# Patient Record
Sex: MALE | Race: WHITE | NOT HISPANIC OR LATINO | Employment: FULL TIME | URBAN - METROPOLITAN AREA
[De-identification: names, ages, dates, MRNs, and addresses within clinical notes are randomized per-mention and may not be internally consistent; named-entity substitution may affect disease eponyms.]

---

## 2024-02-27 ENCOUNTER — EVALUATION (OUTPATIENT)
Dept: PHYSICAL THERAPY | Facility: HOSPITAL | Age: 39
End: 2024-02-27
Payer: COMMERCIAL

## 2024-02-27 DIAGNOSIS — M77.01 MEDIAL EPICONDYLITIS, RIGHT ELBOW: ICD-10-CM

## 2024-02-27 DIAGNOSIS — R29.898 ARM WEAKNESS: Primary | ICD-10-CM

## 2024-02-27 PROCEDURE — 97161 PT EVAL LOW COMPLEX 20 MIN: CPT | Mod: GP | Performed by: PHYSICAL THERAPIST

## 2024-02-27 PROCEDURE — 97110 THERAPEUTIC EXERCISES: CPT | Mod: GP | Performed by: PHYSICAL THERAPIST

## 2024-02-27 ASSESSMENT — PATIENT HEALTH QUESTIONNAIRE - PHQ9
2. FEELING DOWN, DEPRESSED OR HOPELESS: NOT AT ALL
SUM OF ALL RESPONSES TO PHQ9 QUESTIONS 1 AND 2: 0
1. LITTLE INTEREST OR PLEASURE IN DOING THINGS: NOT AT ALL

## 2024-02-27 ASSESSMENT — ENCOUNTER SYMPTOMS
DEPRESSION: 0
OCCASIONAL FEELINGS OF UNSTEADINESS: 0
LOSS OF SENSATION IN FEET: 0

## 2024-02-27 ASSESSMENT — PAIN - FUNCTIONAL ASSESSMENT: PAIN_FUNCTIONAL_ASSESSMENT: 0-10

## 2024-02-27 ASSESSMENT — PAIN SCALES - GENERAL: PAINLEVEL_OUTOF10: 4

## 2024-02-27 NOTE — PROGRESS NOTES
Physical Therapy    Physical Therapy Evaluation    Patient Name: David Baptiste  MRN: [id number]  : 1985  Referring Physician: Pranav Aparicio  Today's Date: 2024  Time Calculation  Start Time: 845  Stop Time: 929  Time Calculation (min): 44 min  PT Evaluation Time Entry  PT Evaluation (Low) Time Entry: 35  PT Therapeutic Procedures Time Entry  Therapeutic Exercise Time Entry: 10             Current Problem  Problem List Items Addressed This Visit             ICD-10-CM    Arm weakness - Primary R29.898    Relevant Orders    Follow Up In Physical Therapy    Medial epicondylitis, right elbow M77.01    Relevant Orders    Follow Up In Physical Therapy          SUBJECTIVE  Subjective   Patient reports right medial elbow pain that began in 2023 insidiously . This is leading to difficulty with gripping for activities such shaking hands.  Pain is reported to range 1-8/10 with daily activities.  Patient states that their goal is to decrease pain with use of right  UE with physical therapy intervention. He is right hand dominant.  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    Prior level of function: no functional limits    Patient's name and  were confirmed this date.    General  General Comment:  POC    Precautions  Precautions  STEADI Fall Risk Score (The score of 4 or more indicates an increased risk of falling): 0  Precautions Comment: none       Pain  Pain Assessment: 0-10  Pain Score: 4  Pain Location: Elbow  Pain Orientation: Right        OBJECTIVE:    Upper Extremity Strength:  UE strength not listed below is WNL  MMT 5/5 max  LEFT RIGHT        Wrist flex 4/5 5/5   Wrist ext 4+/5 5/5   Pronation and Supination 4+/5 4+/5                          strength 73# right vs 112# left    Upper Extremity ROM:   Go UE AROM WNL throughout                 Palpation Tenderness at right medial epicondyle    Special tests:positive right medial and negative lateral  epicondyle test    Other Measures  Disability of Arm Shoulder Hand (DASH): 22.73       TREATMENT:  Access Code: [email or web address]  URL: [email or web address]  Date: 02/27/2024  Prepared by: Deniz Reaves    Exercises  - Seated Wrist Extension Stretch  - 3 x daily - 7 x weekly - 3 reps - 30 seconds hold    EXERCISES Date  Date Date Date   VISIT # # 1 # # #   HEP 10'             Pulley flexion                     Right wrist flex, ext, RD, UD       Right supination and pronation                                                                             US right medial elbow              Manual cross fiber massage                                                                                 ASSESSEMENT  Symptoms indicate right medial epicondylitis    Rehab potential: Excellent    PLAN  Treatment/Interventions: Dry needling, Electrical stimulation, Therapeutic activities, Therapeutic exercises, Ultrasound, Manual therapy  PT Plan: Skilled PT  Duration: 8 weeks    Pt. Is in agreement with PT plan.  Goals:  Active       PT Problem       PT Goal 1       Start:  02/27/24    Expected End:  04/27/24       Short tem goals to be achieve within 4 weeks:  1. Pt's right UE strength will improve to 4+/5 throughout to improve lifting objects    Long term goals to be achieved within 8 weeks:    1. Pt's right  strength will improve to 100# to improve grasping objects  2.   Pt's stated goal is to shake hands without limitation d/t pain

## 2024-03-08 ENCOUNTER — APPOINTMENT (OUTPATIENT)
Dept: RADIOLOGY | Facility: HOSPITAL | Age: 39
End: 2024-03-08
Payer: COMMERCIAL

## 2024-03-11 ENCOUNTER — TREATMENT (OUTPATIENT)
Dept: PHYSICAL THERAPY | Facility: HOSPITAL | Age: 39
End: 2024-03-11
Payer: COMMERCIAL

## 2024-03-11 DIAGNOSIS — M77.01 MEDIAL EPICONDYLITIS, RIGHT ELBOW: ICD-10-CM

## 2024-03-11 DIAGNOSIS — R29.898 ARM WEAKNESS: ICD-10-CM

## 2024-03-11 PROCEDURE — 97110 THERAPEUTIC EXERCISES: CPT | Mod: GP | Performed by: PHYSICAL THERAPIST

## 2024-03-11 PROCEDURE — 97140 MANUAL THERAPY 1/> REGIONS: CPT | Mod: GP | Performed by: PHYSICAL THERAPIST

## 2024-03-11 PROCEDURE — 97014 ELECTRIC STIMULATION THERAPY: CPT | Mod: GP | Performed by: PHYSICAL THERAPIST

## 2024-03-11 ASSESSMENT — PAIN - FUNCTIONAL ASSESSMENT: PAIN_FUNCTIONAL_ASSESSMENT: 0-10

## 2024-03-11 ASSESSMENT — PAIN SCALES - GENERAL: PAINLEVEL_OUTOF10: 2

## 2024-03-11 NOTE — PROGRESS NOTES
Physical Therapy    Physical Therapy Treatment    Patient Name: Daivd Baptiste  MRN: [id number]  : 1985   Pranav Aparicio  Today's Date: 3/11/2024  Time Calculation  Start Time: 1115  Stop Time: 1155  Time Calculation (min): 40 min  PT Therapeutic Procedures Time Entry  Manual Therapy Time Entry: 10  Therapeutic Exercise Time Entry: 12  PT Modalities Time Entry  E-Stim (Unattended) Time Entry: 15  Ultrasound Time Entry: 8          Current Problem  Problem List Items Addressed This Visit             ICD-10-CM    Arm weakness R29.898    Medial epicondylitis, right elbow M77.01        General  General Comment:  POC    Subjective   Pt reports Sx with handshakes has improved with HEP performance   Precautions  Precautions  Precautions Comment: none    Pain  Pain Assessment: 0-10  Pain Score: 2  Pain Location: Elbow  Pain Orientation: Right    Objective      Right  strength improved to 97 pounds        Treatments:    EXERCISES Date 24 Date 3/11/24 Date Date   VISIT # # 1 #/16 POC # #   HEP 10'             Pulley flexion                     Right wrist flex, ext, RD, UD  1# 2x10 each     Right supination and pronation with hammer  X 20 each                                                                           US right medial elbow   1.0 W/cm2 at 100% x8 min            Manual cross fiber massage   10 min                                                                               Assessment:       Plan:  OP PT Plan  Treatment/Interventions: Dry needling, Electrical stimulation, Therapeutic activities, Therapeutic exercises, Ultrasound, Manual therapy  PT Plan: Skilled PT  Duration: 8 weeks    Goals:  Active       PT Problem       PT Goal 1       Start:  24    Expected End:  24       Short tem goals to be achieve within 4 weeks:  1. Pt's right UE strength will improve to 4+/5 throughout to improve lifting objects    Long term goals to be achieved within 8 weeks:    1. Pt's right   strength will improve to 100# to improve grasping objects  2.   Pt's stated goal is to shake hands without limitation d/t pain

## 2024-03-15 ENCOUNTER — TREATMENT (OUTPATIENT)
Dept: PHYSICAL THERAPY | Facility: HOSPITAL | Age: 39
End: 2024-03-15
Payer: COMMERCIAL

## 2024-03-15 DIAGNOSIS — R29.898 ARM WEAKNESS: ICD-10-CM

## 2024-03-15 DIAGNOSIS — M77.01 MEDIAL EPICONDYLITIS, RIGHT ELBOW: ICD-10-CM

## 2024-03-15 PROCEDURE — 97140 MANUAL THERAPY 1/> REGIONS: CPT | Mod: GP | Performed by: PHYSICAL THERAPIST

## 2024-03-15 PROCEDURE — 97014 ELECTRIC STIMULATION THERAPY: CPT | Mod: GP | Performed by: PHYSICAL THERAPIST

## 2024-03-15 PROCEDURE — 97110 THERAPEUTIC EXERCISES: CPT | Mod: GP | Performed by: PHYSICAL THERAPIST

## 2024-03-15 ASSESSMENT — PAIN - FUNCTIONAL ASSESSMENT: PAIN_FUNCTIONAL_ASSESSMENT: 0-10

## 2024-03-15 ASSESSMENT — PAIN SCALES - GENERAL: PAINLEVEL_OUTOF10: 3

## 2024-03-15 NOTE — PROGRESS NOTES
Physical Therapy    Physical Therapy Treatment    Patient Name: David Baptiste  MRN: [id number]  : 1985   Pranav Aparicio  Today's Date: 3/15/2024  Time Calculation  Start Time: 0855  Stop Time: 0940  Time Calculation (min): 45 min  PT Therapeutic Procedures Time Entry  Manual Therapy Time Entry: 10  Therapeutic Exercise Time Entry: 12  PT Modalities Time Entry  E-Stim (Unattended) Time Entry: 16  Ultrasound Time Entry: 8        General  General Comment: 3/16 POC    Current Problem  Problem List Items Addressed This Visit             ICD-10-CM    Arm weakness R29.898    Medial epicondylitis, right elbow M77.01            Subjective   Pt. Reports a mild increase in pain after last visit that resolved after 48 hours   Precautions  Precautions  Precautions Comment: none    Pain  Pain Assessment: 0-10  Pain Score: 3  Pain Location: Elbow  Pain Orientation: Right    Objective      No increase in pain         Treatments:    EXERCISES Date 24 Date 3/11/24 Date 3/15/24 Date   VISIT # # 1 # POC #3/16 POC #   HEP 10'             Pulley flexion                     Right wrist flex, ext, RD, UD  1# 2x10 each 1# 2x10 each    Right supination and pronation with hammer  X 20 each X 20 each                                                                          US right medial elbow   1.0 W/cm2 at 100% x8 min 1.0 W/cm2 at 100% x8 min           Manual cross fiber massage   10 min 10 min           IFC with CP   15 min                                                                Assessment:   No increased pain with treatment    Plan:  OP PT Plan  Treatment/Interventions: Dry needling, Electrical stimulation, Therapeutic activities, Therapeutic exercises, Ultrasound, Manual therapy  PT Plan: Skilled PT  Duration: 8 weeks    Goals:  Active       PT Problem       PT Goal 1       Start:  24    Expected End:  24       Short tem goals to be achieve within 4 weeks:  1. Pt's right UE strength will improve to 4+/5  throughout to improve lifting objects    Long term goals to be achieved within 8 weeks:    1. Pt's right  strength will improve to 100# to improve grasping objects  2.   Pt's stated goal is to shake hands without limitation d/t pain

## 2024-03-19 ENCOUNTER — TREATMENT (OUTPATIENT)
Dept: PHYSICAL THERAPY | Facility: HOSPITAL | Age: 39
End: 2024-03-19
Payer: COMMERCIAL

## 2024-03-19 DIAGNOSIS — R29.898 ARM WEAKNESS: ICD-10-CM

## 2024-03-19 DIAGNOSIS — M77.01 MEDIAL EPICONDYLITIS, RIGHT ELBOW: ICD-10-CM

## 2024-03-19 PROCEDURE — 97140 MANUAL THERAPY 1/> REGIONS: CPT | Mod: GP | Performed by: PHYSICAL THERAPIST

## 2024-03-19 PROCEDURE — 97110 THERAPEUTIC EXERCISES: CPT | Mod: GP | Performed by: PHYSICAL THERAPIST

## 2024-03-19 ASSESSMENT — PAIN SCALES - GENERAL: PAINLEVEL_OUTOF10: 2

## 2024-03-19 ASSESSMENT — PAIN - FUNCTIONAL ASSESSMENT: PAIN_FUNCTIONAL_ASSESSMENT: 0-10

## 2024-03-19 NOTE — PROGRESS NOTES
Physical Therapy    Physical Therapy Treatment    Patient Name: David Baptiste  MRN: [id number]  : 1985   Pranav Aparicio  Today's Date: 3/19/2024  Time Calculation  Start Time: 1000  Stop Time: 1050  Time Calculation (min): 50 min  PT Therapeutic Procedures Time Entry  Manual Therapy Time Entry: 10  Therapeutic Exercise Time Entry: 32  PT Modalities Time Entry  Ultrasound Time Entry: 8        General  General Comment:  POC    Current Problem  Problem List Items Addressed This Visit             ICD-10-CM    Arm weakness R29.898    Medial epicondylitis, right elbow M77.01            Subjective   Pt. Reports pain 5/10 at greatest  over the past several days with ADL's   Precautions  Precautions  Precautions Comment: none    Pain  Pain Assessment: 0-10  Pain Score: 2  Pain Location: Elbow  Pain Orientation: Right    Objective      Right  strength 102 pounds        Treatments:    EXERCISES Date 24 Date 3/11/24 Date 3/15/24 Date 3/19/24   VISIT # # 1 #216 POC #3 POC # POC   HEP 10'             Pulley flexion                     Right wrist flex, ext, RD, UD  1# 2x10 each 1# 2x10 each    Right supination and pronation with hammer  X 20 each X 20 each                                                                          US right medial elbow   1.0 W/cm2 at 100% x8 min 1.0 W/cm2 at 100% x8 min           Manual cross fiber massage   10 min 10 min           IFC with CP   15 min                                                                Assessment:   LTG #1 achieved    Plan:  OP PT Plan  Treatment/Interventions: Dry needling, Electrical stimulation, Therapeutic activities, Therapeutic exercises, Ultrasound, Manual therapy  PT Plan: Skilled PT  Duration: 8 weeks    Goals:  Active       PT Problem       PT Goal 1       Start:  24    Expected End:  24       Short tem goals to be achieve within 4 weeks:  1. Pt's right UE strength will improve to 4+/5 throughout to improve lifting  objects    Long term goals to be achieved within 8 weeks:    1. Pt's right  strength will improve to 100# to improve grasping objects  2.   Pt's stated goal is to shake hands without limitation d/t pain

## 2024-03-22 ENCOUNTER — APPOINTMENT (OUTPATIENT)
Dept: PHYSICAL THERAPY | Facility: HOSPITAL | Age: 39
End: 2024-03-22
Payer: COMMERCIAL

## 2024-03-26 ENCOUNTER — TREATMENT (OUTPATIENT)
Dept: PHYSICAL THERAPY | Facility: HOSPITAL | Age: 39
End: 2024-03-26
Payer: COMMERCIAL

## 2024-03-26 DIAGNOSIS — R29.898 ARM WEAKNESS: Primary | ICD-10-CM

## 2024-03-26 DIAGNOSIS — M77.01 MEDIAL EPICONDYLITIS, RIGHT ELBOW: ICD-10-CM

## 2024-03-26 PROCEDURE — 97140 MANUAL THERAPY 1/> REGIONS: CPT | Mod: GP | Performed by: PHYSICAL THERAPIST

## 2024-03-26 PROCEDURE — 97014 ELECTRIC STIMULATION THERAPY: CPT | Mod: GP | Performed by: PHYSICAL THERAPIST

## 2024-03-26 PROCEDURE — 97110 THERAPEUTIC EXERCISES: CPT | Mod: GP | Performed by: PHYSICAL THERAPIST

## 2024-03-26 ASSESSMENT — PAIN - FUNCTIONAL ASSESSMENT: PAIN_FUNCTIONAL_ASSESSMENT: 0-10

## 2024-03-26 ASSESSMENT — PAIN SCALES - GENERAL: PAINLEVEL_OUTOF10: 5 - MODERATE PAIN

## 2024-03-26 NOTE — PROGRESS NOTES
Physical Therapy    Physical Therapy Treatment    Patient Name: David Baptiste  MRN: [id number]  : 1985   Pranav Aparicio  Today's Date: 3/26/2024  Time Calculation  Start Time: 945  Stop Time: 1028  Time Calculation (min): 43 min  PT Therapeutic Procedures Time Entry  Manual Therapy Time Entry: 10  Therapeutic Exercise Time Entry: 10  PT Modalities Time Entry  E-Stim (Unattended) Time Entry: 15  Ultrasound Time Entry: 8        General  General Comment:  POC    Current Problem  Problem List Items Addressed This Visit             ICD-10-CM    Arm weakness - Primary R29.898    Medial epicondylitis, right elbow M77.01            Subjective     Precautions  Precautions  Precautions Comment: none    Pain  Pain Assessment: 0-10  Pain Score: 5 - Moderate pain  Pain Location: Elbow  Pain Orientation: Right    Objective      Insidious increase in pain today        Treatments:    EXERCISES Date 3/26/24   VISIT #  5/ POC   HEP        Pulley flexion            Right wrist flex, ext, RD, UD  1# 2x10 each   Right supination and pronation with hammer  X 20 each                                           US right medial elbow 1.0 W/cm2 at 100% x8 min       Manual cross fiber massage  10 min       IFC with CP 15 min                                       Assessment:   Insidious increase in pain today prior to PT    Plan:  OP PT Plan  Treatment/Interventions: Dry needling, Electrical stimulation, Therapeutic activities, Therapeutic exercises, Ultrasound, Manual therapy  PT Plan: Skilled PT  Duration: 8 weeks    Goals:  Active       PT Problem       PT Goal 1       Start:  24    Expected End:  24       Short tem goals to be achieve within 4 weeks:  1. Pt's right UE strength will improve to 4+/5 throughout to improve lifting objects    Long term goals to be achieved within 8 weeks:    1. Pt's right  strength will improve to 100# to improve grasping objects  2.   Pt's stated goal is to shake hands without  limitation d/t pain

## 2024-03-29 ENCOUNTER — TREATMENT (OUTPATIENT)
Dept: PHYSICAL THERAPY | Facility: HOSPITAL | Age: 39
End: 2024-03-29
Payer: COMMERCIAL

## 2024-03-29 DIAGNOSIS — R29.898 ARM WEAKNESS: ICD-10-CM

## 2024-03-29 DIAGNOSIS — M77.01 MEDIAL EPICONDYLITIS, RIGHT ELBOW: ICD-10-CM

## 2024-03-29 PROCEDURE — 97035 APP MDLTY 1+ULTRASOUND EA 15: CPT | Mod: GP | Performed by: PHYSICAL THERAPIST

## 2024-03-29 PROCEDURE — 97110 THERAPEUTIC EXERCISES: CPT | Mod: GP | Performed by: PHYSICAL THERAPIST

## 2024-03-29 PROCEDURE — 97014 ELECTRIC STIMULATION THERAPY: CPT | Mod: GP | Performed by: PHYSICAL THERAPIST

## 2024-03-29 ASSESSMENT — PAIN - FUNCTIONAL ASSESSMENT: PAIN_FUNCTIONAL_ASSESSMENT: 0-10

## 2024-03-29 ASSESSMENT — PAIN SCALES - GENERAL: PAINLEVEL_OUTOF10: 1

## 2024-03-29 NOTE — PROGRESS NOTES
Physical Therapy    Physical Therapy Treatment    Patient Name: David Baptiste  MRN: [id number]  : 1985   Pranav Aparicio  Today's Date: 3/29/2024  Time Calculation  Start Time: 830  Stop Time: 915  Time Calculation (min): 45 min  PT Therapeutic Procedures Time Entry  Therapeutic Exercise Time Entry: 22  PT Modalities Time Entry  E-Stim (Unattended) Time Entry: 15  Ultrasound Time Entry: 8        General  General Comment:  POC    Current Problem  Problem List Items Addressed This Visit             ICD-10-CM    Arm weakness R29.898    Medial epicondylitis, right elbow M77.01            Subjective   Pt. Reports 75% improvement in Sx with PT   Precautions  Precautions  Precautions Comment: none    Pain  Pain Assessment: 0-10  Pain Score: 1  Pain Location: Elbow  Pain Orientation: Right    Objective       strength 125 lbs right    Outcome Measures:  Other Measures  Disability of Arm Shoulder Hand (DASH): 18.18    Treatments:    EXERCISES Date 3/26/24  Date 3/29/24   VISIT #  5/16 POC   POC   HEP          Pulley flexion               Right wrist flex, ext, RD, UD  1# 2x10 each  1# 2x10 each   Right supination and pronation with hammer  X 20 each X 20 each                                                     US right medial elbow 1.0 W/cm2 at 100% x8 min 1.0 W/cm2 at 100% x8 min        Manual cross fiber massage  10 min  10 min        IFC with CP 15 min  15 min                                               Assessment:    strength and pain improving well    Plan:  OP PT Plan  Treatment/Interventions: Dry needling, Electrical stimulation, Therapeutic activities, Therapeutic exercises, Ultrasound, Manual therapy  PT Plan: Skilled PT  Duration: 8 weeks    Goals:  Active       PT Problem       PT Goal 1       Start:  24    Expected End:  24       Short tem goals to be achieve within 4 weeks:  1. Pt's right UE strength will improve to 4+/5 throughout to improve lifting objects    Long term  goals to be achieved within 8 weeks:    1. Pt's right  strength will improve to 100# to improve grasping objects(Achieved 3/29/24)  2.   Pt's stated goal is to shake hands without limitation d/t pain

## 2024-04-09 ENCOUNTER — TREATMENT (OUTPATIENT)
Dept: PHYSICAL THERAPY | Facility: HOSPITAL | Age: 39
End: 2024-04-09
Payer: COMMERCIAL

## 2024-04-09 DIAGNOSIS — M77.01 MEDIAL EPICONDYLITIS, RIGHT ELBOW: ICD-10-CM

## 2024-04-09 DIAGNOSIS — R29.898 ARM WEAKNESS: ICD-10-CM

## 2024-04-09 PROCEDURE — 97110 THERAPEUTIC EXERCISES: CPT | Mod: GP | Performed by: PHYSICAL THERAPIST

## 2024-04-09 PROCEDURE — 97014 ELECTRIC STIMULATION THERAPY: CPT | Mod: GP | Performed by: PHYSICAL THERAPIST

## 2024-04-09 PROCEDURE — 97035 APP MDLTY 1+ULTRASOUND EA 15: CPT | Mod: GP | Performed by: PHYSICAL THERAPIST

## 2024-04-09 ASSESSMENT — PAIN SCALES - GENERAL: PAINLEVEL_OUTOF10: 1

## 2024-04-09 ASSESSMENT — PAIN - FUNCTIONAL ASSESSMENT: PAIN_FUNCTIONAL_ASSESSMENT: 0-10

## 2024-04-09 NOTE — PROGRESS NOTES
Physical Therapy    Physical Therapy Treatment    Patient Name: David Baptiste  MRN: [id number]  : 1985   Prnaav Aparicio  Today's Date: 2024  Time Calculation  Start Time: 830  Stop Time: 915  Time Calculation (min): 45 min  PT Therapeutic Procedures Time Entry  Therapeutic Exercise Time Entry: 22  PT Modalities Time Entry  E-Stim (Unattended) Time Entry: 15  Ultrasound Time Entry: 8        General  General Comment:  POC    Current Problem  Problem List Items Addressed This Visit             ICD-10-CM    Arm weakness R29.898    Medial epicondylitis, right elbow M77.01            Subjective   Pt. Reports no greater than 2/10 pain with ADL's and no pain with shaking hands   Precautions  Precautions  Precautions Comment: none    Pain  Pain Assessment: 0-10  Pain Score: 1  Pain Location: Elbow  Pain Orientation: Right    Objective      Right  strength 128# with no pain  Right UE strength 5/5 thoughout    Outcome Measures:  Other Measures  Disability of Arm Shoulder Hand (DASH): 9.09    Treatments:    EXERCISES Date 3/26/24  Date 3/29/24 4/9/24   VISIT #   POC   POC  POC   HEP            Pulley flexion                  Right wrist flex, ext, RD, UD  1# 2x10 each  1# 2x10 each  1# 2x10 each   Right supination and pronation with hammer  X 20 each X 20 each  X 20 each                                                                right medial elbow 1.0 W/cm2 at 100% x8 min 1.0 W/cm2 at 100% x8 min 1.0 W/cm2 at 100% x8 min         Manual cross fiber massage  10 min  10 min           IFC with CP 15 min  15 min 15 min                                                       Assessment:   All goals achieved    Plan:   Discharge     Goals:  Active       PT Problem       PT Goal 1       Start:  24    Expected End:  24       Short tem goals to be achieve within 4 weeks:  1. Pt's right UE strength will improve to 4+/5 throughout to improve lifting objects    Long term goals to be achieved  within 8 weeks:    1. Pt's right  strength will improve to 100# to improve grasping objects(Achieved 3/29/24)  2.   Pt's stated goal is to shake hands without limitation d/t pain

## 2024-04-12 ENCOUNTER — APPOINTMENT (OUTPATIENT)
Dept: PHYSICAL THERAPY | Facility: HOSPITAL | Age: 39
End: 2024-04-12
Payer: COMMERCIAL

## 2024-05-16 ENCOUNTER — EVALUATION (OUTPATIENT)
Dept: PHYSICAL THERAPY | Facility: HOSPITAL | Age: 39
End: 2024-05-16
Payer: COMMERCIAL

## 2024-05-16 DIAGNOSIS — M25.551 PAIN IN RIGHT HIP: Primary | ICD-10-CM

## 2024-05-16 DIAGNOSIS — R29.898 WEAKNESS OF RIGHT HIP: ICD-10-CM

## 2024-05-16 PROCEDURE — 97162 PT EVAL MOD COMPLEX 30 MIN: CPT | Mod: GP | Performed by: PHYSICAL THERAPIST

## 2024-05-16 PROCEDURE — 97110 THERAPEUTIC EXERCISES: CPT | Mod: GP | Performed by: PHYSICAL THERAPIST

## 2024-05-16 ASSESSMENT — ENCOUNTER SYMPTOMS
DEPRESSION: 0
LOSS OF SENSATION IN FEET: 0
OCCASIONAL FEELINGS OF UNSTEADINESS: 0

## 2024-05-16 ASSESSMENT — PAIN - FUNCTIONAL ASSESSMENT: PAIN_FUNCTIONAL_ASSESSMENT: 0-10

## 2024-05-16 NOTE — PROGRESS NOTES
Physical Therapy    Physical Therapy Evaluation and Treatment      Patient Name: David Baptiste  MRN: [id number]  Today's Date: 5/16/2024  Visit #1/13  20 v Hard max Used 7 prior to eval , 13 remaining  No pool or Estim  Time Calculation  Start Time: 0800  Stop Time: 0845  Time Calculation (min): 45 min    Assessment:  Pain Rt hip , low back , pain wraps around to Rt anterior hip flexor area. Chronic pain 15 years which flared up approx 5-1-24 after bending to  paper. He has weakness hip abd and hip extension. He is tender to palpation in Rt glute.  Pt has moderate discogenic degeneration in his thoracic and lumbar spine in his 2021 xray.  Denies any ortho surgery to spine or hips. Pt has a home TENS unit. He has access [facility] and he has had  at gym 3 x week.         Plan:  OP PT Plan  Treatment/Interventions: Cryotherapy, Education/ Instruction, Therapeutic exercises, Therapeutic activities, Neuromuscular re-education, Ultrasound, Manual therapy, Mechanical traction  PT Plan: Skilled PT  PT Frequency: 2 times per week  Duration: 4-6 week  Onset Date: 05/01/24 (15 years, worse around 5-1-24)  Certification Period Start Date: 05/16/24  Certification Period End Date: 08/16/24  Rehab Potential: Good  Plan of Care Agreement: Patient    Current Problem:   1. Pain in right hip  Referral to Physical Therapy    Follow Up In Physical Therapy      2. Weakness of right hip            Subjective   I have had pain low back , tailbone, Rt glute for 15 years managed with 3/10 sitting , 8/10 stand and walk pain, but early May he bent over to  paper and could not stand back up, He needed assistance and had to stay in bed 2 days.  The pain wraps around to Rt hip flexor area . He has access to pool and has used a  3 x week for gym     General:   Rt hip pain.   General  Reason for Referral: Rt hip pain  Referred By: BENJAMIN' Anuj            Pain:  Pain Assessment  Pain Assessment: 0-10  Pain Score:   (5-8/10)  Pain Location: Hip  Pain Orientation: Right  Pain Radiating Towards: Rt hip, low back  Pain Frequency: Constant/continuous  Pain Onset: Ongoing (years of this pain , around 5-1-24 it became acute and worsened after picking up a piece of paper)       Prior Level of Function:  Enjoyed golf , have not done for a year due to elbow issues ( saw PT in early 2024 for this)   kids baseball, stand and bend are difficult at baseline   He goes to gym 3 x week , has , stretches hip and HS and hip rotation       Objective        General Assessments:  Pt is referred for Rt Hip pain. He has had pain for 15 years since he fell on his tailbone. Baseline pain is 3/10 sitting and 8/10 standing walking more than 5 minutes.     2 weeks ago he bent over to  a piece of paper and could not stand back up wiithout assistance , stated he felt a pop in back and excruciating pain 2 weeks ago , had to be in bed  for 2 days . Pain was in low back and around Rt trunk into Rt hip flexor area  tailbone which is same location as his baseline pain but it was more severe and debilitating.       Gallbladder issues, choleserolosis  Hx of CT Abdominal and Pelvic: No evidence of acute abdominal or pelvic pathology   Xray : Incidental small scattered bone islands in the pelvis and  sacrum and proximal femora. Mild facet degeneration in the lower  lumbar spine. 2021 Moderate discogenic degeneration in the lower thoracic  spine and at L5-S1.  Functional Assessments:  Assists sons baseball as     Extremity/Trunk Assessments:    Trunk AROM   Flex WNL  Ext WNL  Side Bend WNL ( Pain  Rt side with side bend left )  Knee ROM full  Hip flex SKC pain Rt  WFL    Rt hip flex 4/5 ( pain )   left 5/5  Hip abd Rt 3+/5   left 5/6  Hip extension Rt 3+/5   left   Knee ext 5/5  Knee flex 5/5  Ankle DF 5/5  Rt leg raise shakes with abd and with prone hip ext    Figure 4 Rt unable . Left OK   HS 55 fam      Singe leg Rt stance ,  Decreased Balance  Amb with slow gait, decreased gerald and stride length      Outcome Measures:  LEFS 44       Treatments:     EXERCISES       Date       VISIT# #2/13 # # #    REPS REPS REPS REPS          Nustep       Sidelying Rt hip abd  Clam shells  Prone Rt hip ext       Pelvic tilt  Bridge with SB       HS stretch sitting                      Physioball sit pelvic rocking               Standing LE step up lateral  Hip hikes from small step  Trunk extension              Has home Tens unit,, no estim billable       US  Rt glute       Manual Rt LB and glute       Aquatics not included in auth but has access to pool       HEP        Access Code: [email or web address]  URL: [email or web address]  Date: 05/16/2024  Prepared by: Najma Morales    Exercises  - Supine Posterior Pelvic Tilt  - 1 x daily - 7 x weekly - 10 reps - 5 hold  - Sidelying Hip Abduction  - 1 x daily - 7 x weekly - 10 reps  - Prone Hip Extension  - 1 x daily - 7 x weekly - 10 reps  - Seated Piriformis Stretch  - 1 x daily - 7 x weekly - 2 reps - 30 hold  - Supine Piriformis Stretch with Leg Straight  - 1 x daily - 7 x weekly - 2 reps - 30 hold  - Standing Lumbar Extension  - 1 x daily - 7 x weekly - 10 reps  EDUCATION:  Outpatient Education  Individual(s) Educated: Patient  Education Provided: Home Exercise Program, POC  Education Comment: pt encourage to go to pool at his rec center and water walk , side step    Goals:  Active       PT Problem       PT Goal        Start:  05/16/24    Expected End:  06/06/24         1 Patient to be independent with home exercises within pain free range to stretch Lower extremities and to strengthen core abdominals and core stabilization to increase mobility  2 Patient to demonstrate understanding of what it means to have neutral posture alignment, the use of positioning strategies and body mechanics principles to reduce pain with everyday activities.  3 Patient to gain the functional range of motion  necessary in the  lumbar spine to have no pain with side bend or ROM  4 Pt to water walk and do aquatics on his own at [facility] center to build his activity tolerance and to reduce pain           PT Goal        Start:  05/16/24    Expected End:  06/27/24         1 Patient to have reduced pain to 5/10 Or back to his baseline pain level prior to flare up that happened 5-1-24  2 Patient to demonstrate 1/3 MMT strength gain or more in targeted muscle groups and core strength for increased overall mobility  3 Patient to demonstrate a 4 point or more change in LEFS to indicate reduced impairment with every day living   4 Patient to return to community mobility and household chores and job related activity with pain levels managed using increased awareness of engaging core stabilization and strategies utilized to manage pain.

## 2024-05-22 ENCOUNTER — TREATMENT (OUTPATIENT)
Dept: PHYSICAL THERAPY | Facility: HOSPITAL | Age: 39
End: 2024-05-22
Payer: COMMERCIAL

## 2024-05-22 DIAGNOSIS — M25.551 PAIN IN RIGHT HIP: ICD-10-CM

## 2024-05-22 PROCEDURE — 97110 THERAPEUTIC EXERCISES: CPT | Mod: GP,CQ

## 2024-05-22 PROCEDURE — 97035 APP MDLTY 1+ULTRASOUND EA 15: CPT | Mod: GP,CQ

## 2024-05-22 NOTE — PROGRESS NOTES
Physical Therapy    Physical Therapy Treatment    Patient Name: David Baptiste  MRN: [id number]  : 1985   Today's Date: 2024  Time Calculation  Start Time: 845  Stop Time: 930  Time Calculation (min): 45 min  Visit #2    PT Therapeutic Procedures Time Entry  Therapeutic Exercise Time Entry: 37  PT Modalities Time Entry  Ultrasound Time Entry: 8       Current Problem  1. Pain in right hip  Follow Up In Physical Therapy            Subjective   Pt states he has been working out on his own and is doing good with it and goes to the pool as well.        Precautions          Pain   4/10    Objective      Added exercises     Treatments:  EXERCISES       Date 24      VISIT# #2/13 # # #    REPS REPS REPS REPS          Nustep L2 10 '      Sidelying Rt hip abd  Clam shells  Prone Rt hip ext 2 x 10  2 x 10  2 x 10      Pelvic tilt  Bridge with SB   3 sec x 15      HS stretch sitting                      Physioball sit pelvic rocking               Standing LE step up lateral  Hip hikes from small step  Trunk extension   2 in 2 x 10 RLE             Has home Tens unit,, no estim billable       US  Rt glute 8' 1.5 w/cm2       Manual Rt LB and glute       Aquatics not included in auth but has access to pool       HEP         Assessment:  Pt states he is doing HEP. Pt states with mat table exercises pt can feel it working the area that is bothering him in a good way. Pt states he felt a little better after US.       Plan:   Treatment/Interventions: Cryotherapy, Education/ Instruction, Therapeutic exercises, Therapeutic activities, Neuromuscular re-education, Ultrasound, Manual therapy, Mechanical traction  PT Plan: Skilled PT  PT Frequency: 2 times per week  Duration: 4-6 week  Onset Date: 24 (15 years, worse around -)  Certification Period Start Date: 24  Certification Period End Date: 24  Rehab Potential: Good  Plan of Care Agreement: Patient    OP EDUCATION:       Goals:  Active       PT  Problem       PT Goal        Start:  05/16/24    Expected End:  06/06/24         1 Patient to be independent with home exercises within pain free range to stretch Lower extremities and to strengthen core abdominals and core stabilization to increase mobility  2 Patient to demonstrate understanding of what it means to have neutral posture alignment, the use of positioning strategies and body mechanics principles to reduce pain with everyday activities.  3 Patient to gain the functional range of motion necessary in the  lumbar spine to have no pain with side bend or ROM  4 Pt to water walk and do aquatics on his own at [facility] to build his activity tolerance and to reduce pain           PT Goal        Start:  05/16/24    Expected End:  06/27/24         1 Patient to have reduced pain to 5/10 Or back to his baseline pain level prior to flare up that happened 5-1-24  2 Patient to demonstrate 1/3 MMT strength gain or more in targeted muscle groups and core strength for increased overall mobility  3 Patient to demonstrate a 4 point or more change in LEFS to indicate reduced impairment with every day living   4 Patient to return to community mobility and household chores and job related activity with pain levels managed using increased awareness of engaging core stabilization and strategies utilized to manage pain.

## 2024-05-28 ENCOUNTER — TREATMENT (OUTPATIENT)
Dept: PHYSICAL THERAPY | Facility: HOSPITAL | Age: 39
End: 2024-05-28
Payer: COMMERCIAL

## 2024-05-28 DIAGNOSIS — M25.551 PAIN IN RIGHT HIP: Primary | ICD-10-CM

## 2024-05-28 DIAGNOSIS — R29.898 WEAKNESS OF RIGHT HIP: ICD-10-CM

## 2024-05-28 PROCEDURE — 97110 THERAPEUTIC EXERCISES: CPT | Mod: GP,CQ

## 2024-05-28 ASSESSMENT — PAIN SCALES - GENERAL: PAINLEVEL_OUTOF10: 2

## 2024-05-28 ASSESSMENT — PAIN - FUNCTIONAL ASSESSMENT: PAIN_FUNCTIONAL_ASSESSMENT: 0-10

## 2024-05-28 NOTE — PROGRESS NOTES
"Physical Therapy    Physical Therapy Treatment    Patient Name: David Baptiste  MRN: [id number]  : 1985   Today's Date: 2024  Time Calculation  Start Time: 0900  Stop Time: 45  Time Calculation (min): 45 min  Visit #3    PT Therapeutic Procedures Time Entry  Therapeutic Exercise Time Entry: 45          Current Problem  1. Pain in right hip  Follow Up In Physical Therapy      2. Weakness of right hip              Subjective   Pt reported ultrasound helped a lot and his pain has been lower. He worked with his  at the gym today with squats and the leg press. He noted he works out 3x a week a the gym.     Precautions  Precautions  Precautions Comment: none       Pain  Pain Assessment: 0-10  Pain Score: 2  Pain Location: Hip  Pain Orientation: Right  Pain Radiating Towards: hip    Objective    Added hs stretch      Treatments:  EXERCISES       Date 24     VISIT# #2/13 #3/ # #    REPS REPS REPS REPS          Nustep L2 10 ' L3 10'     Sidelying Rt hip abd  Clam shells  Prone Rt hip ext 2 x 10  2 x 10  2 x 10 2x10  2x10  2x10     Pelvic tilt  Bridge with SB   3 sec x 15 10x5\" H  3\" H 2x10     HS stretch sitting                      Physioball sit pelvic rocking               Standing LE step up lateral  Hip hikes from small step  Trunk extension   2 in 2 x 10 RLE   2 in 2 x 10 RLE            Has home Tens unit,, no estim billable       US  Rt glute 8' 1.5 w/cm2  8' 1.5 w/cm2 /1mhz     Manual Rt LB and glute       Aquatics not included in auth but has access to pool       HEP         Assessment:  Pt noted he feels the most weakness with prone hip extension. Pt needed min cues for PPT hold times, good technique.        Plan: Improve LE mobility and strengthen core. Progress HEP as needed.      Treatment/Interventions: Cryotherapy, Education/ Instruction, Therapeutic exercises, Therapeutic activities, Neuromuscular re-education, Ultrasound, Manual therapy, Mechanical traction  PT Plan: " Skilled PT  PT Frequency: 2 times per week  Duration: 4-6 week  Onset Date: 05/01/24 (15 years, worse around 5-1-24)  Certification Period Start Date: 05/16/24  Certification Period End Date: 08/16/24  Rehab Potential: Good  Plan of Care Agreement: Patient    OP EDUCATION:       Goals:  Active       PT Problem       PT Goal        Start:  05/16/24    Expected End:  06/06/24         1 Patient to be independent with home exercises within pain free range to stretch Lower extremities and to strengthen core abdominals and core stabilization to increase mobility  2 Patient to demonstrate understanding of what it means to have neutral posture alignment, the use of positioning strategies and body mechanics principles to reduce pain with everyday activities.  3 Patient to gain the functional range of motion necessary in the  lumbar spine to have no pain with side bend or ROM  4 Pt to water walk and do aquatics on his own at rec center to build his activity tolerance and to reduce pain           PT Goal        Start:  05/16/24    Expected End:  06/27/24         1 Patient to have reduced pain to 5/10 Or back to his baseline pain level prior to flare up that happened 5-1-24  2 Patient to demonstrate 1/3 MMT strength gain or more in targeted muscle groups and core strength for increased overall mobility  3 Patient to demonstrate a 4 point or more change in LEFS to indicate reduced impairment with every day living   4 Patient to return to community mobility and household chores and job related activity with pain levels managed using increased awareness of engaging core stabilization and strategies utilized to manage pain.

## 2024-05-30 ENCOUNTER — APPOINTMENT (OUTPATIENT)
Dept: PHYSICAL THERAPY | Facility: HOSPITAL | Age: 39
End: 2024-05-30
Payer: COMMERCIAL

## 2024-06-04 ENCOUNTER — APPOINTMENT (OUTPATIENT)
Dept: PHYSICAL THERAPY | Facility: HOSPITAL | Age: 39
End: 2024-06-04
Payer: COMMERCIAL

## 2024-06-05 ENCOUNTER — HOSPITAL ENCOUNTER (OUTPATIENT)
Dept: RADIOLOGY | Facility: EXTERNAL LOCATION | Age: 39
Discharge: HOME | End: 2024-06-05
Payer: COMMERCIAL

## 2024-06-05 DIAGNOSIS — R06.02 SHORTNESS OF BREATH: ICD-10-CM

## 2024-06-06 ENCOUNTER — TREATMENT (OUTPATIENT)
Dept: PHYSICAL THERAPY | Facility: HOSPITAL | Age: 39
End: 2024-06-06
Payer: COMMERCIAL

## 2024-06-06 DIAGNOSIS — M25.551 PAIN IN RIGHT HIP: Primary | ICD-10-CM

## 2024-06-06 DIAGNOSIS — R29.898 WEAKNESS OF RIGHT HIP: ICD-10-CM

## 2024-06-06 PROCEDURE — 97110 THERAPEUTIC EXERCISES: CPT | Mod: GP | Performed by: PHYSICAL THERAPIST

## 2024-06-06 ASSESSMENT — PAIN SCALES - GENERAL: PAINLEVEL_OUTOF10: 2

## 2024-06-06 ASSESSMENT — PAIN - FUNCTIONAL ASSESSMENT: PAIN_FUNCTIONAL_ASSESSMENT: 0-10

## 2024-06-06 NOTE — PROGRESS NOTES
"Physical Therapy    Physical Therapy    Physical Therapy Treatment      Patient Name: David Baptiste  MRN: [id number]  Today's Date: 6/6/2024  Visit # 4  Time Calculation  Start Time: 0800  Stop Time: 0846  Time Calculation (min): 46 min      Subjective  treatment has helped . Walked a lot and did well, works out at gym 3 x week  Has glute and lat rt hip soreness improving, would like to have US again today            Current Problem:   1. Pain in right hip  Follow Up In Physical Therapy      2. Weakness of right hip            Pain:  Pain Assessment  Pain Assessment: 0-10  Pain Score: 2  Pain Location: Hip  Pain Orientation: Right  Pain Onset: Ongoing      Objective        Progressed resistance ex for HEP , Q hip  US to glute and      Pain Rt hip , low back , pain wraps around to Rt anterior hip flexor area. Chronic pain 15 years which flared up approx 5-1-24 after bending to  paper. He has weakness hip abd and hip extension. He is tender to palpation in Rt glute.  Pt has moderate discogenic degeneration in his thoracic and lumbar spine in his 2021 xray.  Denies any ortho surgery to spine or hips. Pt has a home TENS unit. He has access to pool at [facility] and he has had  at gym 3 x week.     Rt hip flex 4/5 ( pain )   left 5/5  Hip abd Rt 3+/5   left 5/6  Hip extension Rt 3+/5   left     HS 55 fam     Treatments:  EXERCISES           Date 5/22/24 5/28/24 6/6/24     VISIT# #2/13 #3/13 #4/13 #     REPS REPS REPS REPS               Nustep L2 10 ' L3 10'  L3 10      Sidelying Rt hip abd  Clam shells  Prone Rt hip ext 2 x 10  2 x 10  2 x 10 2x10  2x10  2x10  add tband blue for HEP     Pelvic tilt  Bridge with SB    3 sec x 15 10x5\" H  3\" H 2x10  add tband for HEp     HS stretch sitting       30' x 2      calf stretch      30' x 2                 Physioball sit pelvic rocking                        Standing LE step up lateral  Hip hikes from small step  Trunk extension    2 in 2 x 10 RLE    2 in 2 x 10 " RLE        Q hip   Flex  Abd   Ext       10 #  10 x 2 ea  10 x 2 ea  10 x 2 ea     Has home Tens unit,, no estim billable           US  Rt glute 8' 1.5 w/cm2  8' 1.5 w/cm2 /1mhz  8  1.5  W/cm2  /cms     Manual Rt LB and glute           Aquatics not included in auth but has access to pool           HEP      tband          Outpatient Education  Education Comment: pt encourage to go to pool at his rec center and water walk , side step  Assessment:  Pt cued to engage core with all exercises and added Q hip, tband for home      Plan: continue to strengthen Rt LE and core DLS    Goals:  Active       PT Problem       PT Goal        Start:  05/16/24    Expected End:  06/06/24         1 Patient to be independent with home exercises within pain free range to stretch Lower extremities and to strengthen core abdominals and core stabilization to increase mobility  2 Patient to demonstrate understanding of what it means to have neutral posture alignment, the use of positioning strategies and body mechanics principles to reduce pain with everyday activities.  3 Patient to gain the functional range of motion necessary in the  lumbar spine to have no pain with side bend or ROM  4 Pt to water walk and do aquatics on his own at rec center to build his activity tolerance and to reduce pain           PT Goal        Start:  05/16/24    Expected End:  06/27/24         1 Patient to have reduced pain to 5/10 Or back to his baseline pain level prior to flare up that happened 5-1-24  2 Patient to demonstrate 1/3 MMT strength gain or more in targeted muscle groups and core strength for increased overall mobility  3 Patient to demonstrate a 4 point or more change in LEFS to indicate reduced impairment with every day living   4 Patient to return to community mobility and household chores and job related activity with pain levels managed using increased awareness of engaging core stabilization and strategies utilized to manage pain.

## 2024-06-11 ENCOUNTER — TREATMENT (OUTPATIENT)
Dept: PHYSICAL THERAPY | Facility: HOSPITAL | Age: 39
End: 2024-06-11
Payer: COMMERCIAL

## 2024-06-11 DIAGNOSIS — M25.551 PAIN IN RIGHT HIP: Primary | ICD-10-CM

## 2024-06-11 DIAGNOSIS — R29.898 WEAKNESS OF RIGHT HIP: ICD-10-CM

## 2024-06-11 PROCEDURE — 97110 THERAPEUTIC EXERCISES: CPT | Mod: GP | Performed by: PHYSICAL THERAPIST

## 2024-06-11 PROCEDURE — 97035 APP MDLTY 1+ULTRASOUND EA 15: CPT | Mod: GP | Performed by: PHYSICAL THERAPIST

## 2024-06-11 ASSESSMENT — PAIN SCALES - GENERAL: PAINLEVEL_OUTOF10: 2

## 2024-06-11 NOTE — PROGRESS NOTES
"Physical Therapy    Physical Therapy    Physical Therapy Treatment      Patient Name: David Baptiste  MRN: [id number]  Today's Date: 6/11/2024  Visit # 5  Time Calculation  Start Time: 0930  Stop Time: 1014  Time Calculation (min): 44 min      Subjective     treatment has helped . Walked a lot and did well   works out at gym 3 x week and squats are stiff in hips  Has glute and lat rt hip soreness improving, would like to have US again today           Current Problem:   1. Pain in right hip  Follow Up In Physical Therapy      2. Weakness of right hip            Pain:  Pain Assessment  Pain Score: 2  Pain Location: Hip  Pain Orientation: Right  Pain Onset: Ongoing      Objective         Progressed resistance ex for HEP , Q hip  US to glute and       Pain Rt hip , low back , pain wraps around to Rt anterior hip flexor area. Chronic pain 15 years which flared up approx 5-1-24 after bending to  paper. He has weakness hip abd and hip extension. He is tender to palpation in Rt glute.  Pt has moderate discogenic degeneration in his thoracic and lumbar spine in his 2021 xray.  Denies any ortho surgery to spine or hips. Pt has a home TENS unit. He has access to pool at [facility] and he has had  at gym 3 x week.          Treatments:  EXERCISES           Date 5/22/24 5/28/24 6/6/24 6/11/24   VISIT# #2/13 #3/13 #4/13 #5     REPS REPS REPS REPS               Nustep L2 10 ' L3 10'  L3 10   L3 10 min   Sidelying Rt hip abd  Clam shells  Prone Rt hip ext 2 x 10  2 x 10  2 x 10 2x10  2x10  2x10  add tband blue for HEP  HEP   Pelvic tilt  Bridge with SB    3 sec x 15 10x5\" H  3\" H 2x10  add tband for HEp     HS stretch sitting       30' x 2      calf stretch      30' x 2      stand hip adduct stretch        30' x 2    Physioball sit pelvic rocking                        Standing LE step up lateral  Hip hikes from small step  Trunk extension    2 in 2 x 10 RLE    2 in 2 x 10 RLE        Q hip   Flex  Abd   Ext       10 " #  10 x 2 ea  10 x 2 ea  10 x 2 ea  NP   Has home Tens unit,, no estim billable           US  Rt glute 8' 1.5 w/cm2  8' 1.5 w/cm2 /1mhz  8 ' 1.5 w/cm2  8' 1.5 w/ cm2  Prone while Hip IR/ ER [id number]    Hip stretch IR/ ER  Seated ER  Supine IR stretch    Prone knee flex and Hip IR/ ER    30 sec each leg ea. Prefers prone IR/ ER     10x ea   Manual Rt LB and glute           Aquatics not included in auth but has access to pool           HEP      tband            Assessment: added hip rotation stretch prone with knee flexed   Added seated Hip ER.  Added the hip flexor stretch over edge of bed. Hip adduction stretch standing     Plan: stretch hips see how tolerates this    Goals:  Active       PT Problem       PT Goal        Start:  05/16/24    Expected End:  06/06/24         1 Patient to be independent with home exercises within pain free range to stretch Lower extremities and to strengthen core abdominals and core stabilization to increase mobility  2 Patient to demonstrate understanding of what it means to have neutral posture alignment, the use of positioning strategies and body mechanics principles to reduce pain with everyday activities.  3 Patient to gain the functional range of motion necessary in the  lumbar spine to have no pain with side bend or ROM  4 Pt to water walk and do aquatics on his own at [facility] center to build his activity tolerance and to reduce pain           PT Goal        Start:  05/16/24    Expected End:  06/27/24         1 Patient to have reduced pain to 5/10 Or back to his baseline pain level prior to flare up that happened 5-1-24  2 Patient to demonstrate 1/3 MMT strength gain or more in targeted muscle groups and core strength for increased overall mobility  3 Patient to demonstrate a 4 point or more change in LEFS to indicate reduced impairment with every day living   4 Patient to return to community mobility and household chores and job related activity with pain levels managed using  increased awareness of engaging core stabilization and strategies utilized to manage pain.

## 2024-06-13 ENCOUNTER — TREATMENT (OUTPATIENT)
Dept: PHYSICAL THERAPY | Facility: HOSPITAL | Age: 39
End: 2024-06-13
Payer: COMMERCIAL

## 2024-06-13 DIAGNOSIS — M25.551 PAIN IN RIGHT HIP: ICD-10-CM

## 2024-06-13 DIAGNOSIS — R29.898 WEAKNESS OF RIGHT HIP: Primary | ICD-10-CM

## 2024-06-13 PROCEDURE — 97035 APP MDLTY 1+ULTRASOUND EA 15: CPT | Mod: GP | Performed by: PHYSICAL THERAPIST

## 2024-06-13 PROCEDURE — 97110 THERAPEUTIC EXERCISES: CPT | Mod: GP | Performed by: PHYSICAL THERAPIST

## 2024-06-13 ASSESSMENT — PAIN SCALES - GENERAL: PAINLEVEL_OUTOF10: 1

## 2024-06-13 NOTE — PROGRESS NOTES
"Physical Therapy    Physical Therapy    Physical Therapy Treatment      Patient Name: David Baptiste  MRN: [id number]  Today's Date: 6/13/2024  Visit # 6  Time Calculation  Start Time: 0800  Stop Time: 0843  Time Calculation (min): 43 min      Subjective  1/10 , doing better per pt in rt glute / hip  Stretching and US has helped.                Current Problem:   1. Weakness of right hip        2. Pain in right hip  Follow Up In Physical Therapy          Pain:  Pain Assessment  Pain Score: 1  Pain Location: Hip  Pain Orientation: Right  Pain Onset: Ongoing      Objective         Tolerated all ex with no increase in symptoms  US to glute   Rt LE weaker than left on Shuttle Single leg     Pain Rt hip , low back , pain wraps around to Rt anterior hip flexor area. Chronic pain 15 years which flared up approx 5-1-24 after bending to  paper. He has weakness hip abd and hip extension. He is tender to palpation in Rt glute.  Pt has moderate discogenic degeneration in his thoracic and lumbar spine in his 2021 xray.  Denies any ortho surgery to spine or hips. Pt has a home TENS unit. He has access to pool at [facility] and he has had  at gym 3 x week.          Treatments:  Treatments:  EXERCISES            Date 5/22/24 5/28/24 6/6/24 6/11/24 6-13-24   VISIT# #2/13 #3/13 #4/13 #5 #6     REPS REPS REPS REPS                 Nustep L2 10 ' L3 10'  L3 10   L3 10 min L3 10 min   Sidelying Rt hip abd  Clam shells  Prone Rt hip ext 2 x 10  2 x 10  2 x 10 2x10  2x10  2x10  add tband blue for HEP  HEP HEP   Pelvic tilt  Bridge with SB    3 sec x 15 10x5\" H  3\" H 2x10  add tband for HEp   Perform with all ex   HS stretch sitting       30' x 2       calf stretch      30' x 2       stand hip adduct stretch        30' x 2  30'x 2   Physioball sit pelvic rocking              Shuttle DLP  Shuttle SLP         8B 10 x 2  6B 10 x 2   Standing LE step up lateral  Hip hikes from small step  Trunk extension    2 in 2 x 10 RLE    2 " in 2 x 10 RLE         Q hip   Flex  Abd   Ext       10 #  10 x 2 ea  10 x 2 ea  10 x 2 ea  NP 10 #  10 x 2 ea  10 x 2 ea  10 x 2 ea   Has home Tens unit,, no estim billable            US  Rt glute 8' 1.5 w/cm2  8' 1.5 w/cm2 /1mhz  8 ' 1.5 w/cm2  8' 1.5 w/ cm2  Prone while Hip IR/ ER [id number]  8' 1.5 w/ cm2  Prone while Hip IR/ ER [id number]   Hip stretch IR/ ER  Seated ER  Supine IR stretch     Prone knee flex and Hip IR/ ER       30 sec each leg ea. Prefers prone IR/ ER      10x ea 30 sec each leg ea. Prefers prone IR/ ER      10x ea   Manual Rt LB and glute            Aquatics not included in auth but has access to pool            HEP      tband                    Plan:  continue to stretch and core strength to reduce pain    Goals:  Active       PT Problem       PT Goal        Start:  05/16/24    Expected End:  06/06/24         1 Patient to be independent with home exercises within pain free range to stretch Lower extremities and to strengthen core abdominals and core stabilization to increase mobility  2 Patient to demonstrate understanding of what it means to have neutral posture alignment, the use of positioning strategies and body mechanics principles to reduce pain with everyday activities.  3 Patient to gain the functional range of motion necessary in the  lumbar spine to have no pain with side bend or ROM  4 Pt to water walk and do aquatics on his own at [facility] center to build his activity tolerance and to reduce pain           PT Goal        Start:  05/16/24    Expected End:  06/27/24         1 Patient to have reduced pain to 5/10 Or back to his baseline pain level prior to flare up that happened 5-1-24  2 Patient to demonstrate 1/3 MMT strength gain or more in targeted muscle groups and core strength for increased overall mobility  3 Patient to demonstrate a 4 point or more change in LEFS to indicate reduced impairment with every day living   4 Patient to return to community mobility and household chores  and job related activity with pain levels managed using increased awareness of engaging core stabilization and strategies utilized to manage pain.

## 2024-06-18 ENCOUNTER — APPOINTMENT (OUTPATIENT)
Dept: PHYSICAL THERAPY | Facility: HOSPITAL | Age: 39
End: 2024-06-18
Payer: COMMERCIAL

## 2024-06-20 ENCOUNTER — DOCUMENTATION (OUTPATIENT)
Dept: PHYSICAL THERAPY | Facility: HOSPITAL | Age: 39
End: 2024-06-20
Payer: COMMERCIAL

## 2024-06-20 NOTE — PROGRESS NOTES
Physical Therapy                 Therapy Communication Note    Patient Name: David Baptiste  MRN: [id number]  Today's Date: 6/20/2024     Discipline: Physical Therapy    Missed Visit Reason:      Missed Time: No Show    Comment: PT called pt and he said he was in work meeting and did not make appt  He will  call to reschedule

## 2024-08-05 ENCOUNTER — DOCUMENTATION (OUTPATIENT)
Dept: PHYSICAL THERAPY | Facility: HOSPITAL | Age: 39
End: 2024-08-05
Payer: COMMERCIAL

## 2024-08-05 NOTE — PROGRESS NOTES
Physical Therapy    Discharge Summary    Name: David Baptiste  MRN: [id number]  : 1985  Date: 24    Discharge Summary: PT    Discharge Information:   Dx weakness Rt hip. Pain Rt hip  Date of eval 24  Date of last tx 24  Total visit #6  Pt no show , last tx was 24    Rehab Discharge Reason:   plan of care

## 2025-03-11 ENCOUNTER — HOSPITAL ENCOUNTER (OUTPATIENT)
Dept: RADIOLOGY | Facility: HOSPITAL | Age: 40
Discharge: HOME | End: 2025-03-11
Payer: COMMERCIAL

## 2025-03-11 DIAGNOSIS — M79.671 PAIN IN RIGHT FOOT: ICD-10-CM

## 2025-03-11 PROCEDURE — 73630 X-RAY EXAM OF FOOT: CPT | Mod: RIGHT SIDE | Performed by: RADIOLOGY

## 2025-03-11 PROCEDURE — 73630 X-RAY EXAM OF FOOT: CPT | Mod: RT
